# Patient Record
Sex: FEMALE | URBAN - METROPOLITAN AREA
[De-identification: names, ages, dates, MRNs, and addresses within clinical notes are randomized per-mention and may not be internally consistent; named-entity substitution may affect disease eponyms.]

---

## 2017-11-09 ENCOUNTER — IMPORTED ENCOUNTER (OUTPATIENT)
Dept: URBAN - METROPOLITAN AREA CLINIC 1 | Facility: CLINIC | Age: 32
End: 2017-11-09

## 2017-11-09 PROBLEM — B30.9: Noted: 2017-11-09

## 2017-11-09 PROBLEM — H10.45: Noted: 2017-11-09

## 2017-11-09 PROCEDURE — 92002 INTRM OPH EXAM NEW PATIENT: CPT

## 2017-11-09 NOTE — PATIENT DISCUSSION
1.  Viral Conjunctivitis OU (OS>OD) -- The condition was discussed with the patient. Cool compresses and  PF artificial tears Q1H were recommended. The mechanism of transmission was explained and the patient was educated to wash hands and use separate towels and bedding. Erx Pred Forte TID OS only**REC no Cls 2. Allergic Conjunctivitis OU :  Condition discussed with patient. Advised patient to use OTC Zaditor one drop OU BID prn.3. Return for an appointment on Monday for 10. with Dr. Magdaleno Eddy.

## 2022-04-08 ASSESSMENT — VISUAL ACUITY
OD_SC: 20/20
OS_SC: 20/25

## 2022-04-08 ASSESSMENT — TONOMETRY
OS_IOP_MMHG: 11
OD_IOP_MMHG: 10